# Patient Record
Sex: FEMALE | Race: ASIAN
[De-identification: names, ages, dates, MRNs, and addresses within clinical notes are randomized per-mention and may not be internally consistent; named-entity substitution may affect disease eponyms.]

---

## 2018-03-02 ENCOUNTER — APPOINTMENT (OUTPATIENT)
Dept: ORTHOPEDIC SURGERY | Facility: CLINIC | Age: 83
End: 2018-03-02
Payer: MEDICARE

## 2018-03-02 VITALS — BODY MASS INDEX: 24.19 KG/M2 | WEIGHT: 120 LBS | HEIGHT: 59 IN

## 2018-03-02 DIAGNOSIS — I10 ESSENTIAL (PRIMARY) HYPERTENSION: ICD-10-CM

## 2018-03-02 DIAGNOSIS — Z80.9 FAMILY HISTORY OF MALIGNANT NEOPLASM, UNSPECIFIED: ICD-10-CM

## 2018-03-02 DIAGNOSIS — S53.105A UNSPECIFIED DISLOCATION OF LEFT ULNOHUMERAL JOINT, INITIAL ENCOUNTER: ICD-10-CM

## 2018-03-02 PROBLEM — Z00.00 ENCOUNTER FOR PREVENTIVE HEALTH EXAMINATION: Status: ACTIVE | Noted: 2018-03-02

## 2018-03-02 PROCEDURE — 99204 OFFICE O/P NEW MOD 45 MIN: CPT

## 2018-03-02 RX ORDER — FUROSEMIDE 20 MG/1
20 TABLET ORAL
Qty: 14 | Refills: 0 | Status: ACTIVE | COMMUNITY
Start: 2017-09-03

## 2018-03-02 RX ORDER — TRAMADOL HYDROCHLORIDE 50 MG/1
50 TABLET, COATED ORAL
Qty: 30 | Refills: 0 | Status: ACTIVE | COMMUNITY
Start: 2017-12-27

## 2018-03-02 RX ORDER — CYCLOSPORINE 0.5 MG/ML
0.05 EMULSION OPHTHALMIC
Qty: 60 | Refills: 0 | Status: ACTIVE | COMMUNITY
Start: 2018-02-05

## 2018-03-02 RX ORDER — GABAPENTIN 100 MG/1
100 CAPSULE ORAL
Qty: 270 | Refills: 0 | Status: ACTIVE | COMMUNITY
Start: 2018-02-05

## 2018-03-02 RX ORDER — HYDROCHLOROTHIAZIDE 12.5 MG/1
12.5 TABLET ORAL
Qty: 90 | Refills: 0 | Status: ACTIVE | COMMUNITY
Start: 2018-02-05

## 2018-03-02 RX ORDER — OLOPATADINE HYDROCHLORIDE 7 MG/ML
0.7 SOLUTION OPHTHALMIC
Qty: 2 | Refills: 0 | Status: ACTIVE | COMMUNITY
Start: 2017-10-29

## 2018-03-02 RX ORDER — RIVAROXABAN 15 MG/1
15 TABLET, FILM COATED ORAL
Qty: 90 | Refills: 0 | Status: ACTIVE | COMMUNITY
Start: 2017-12-07

## 2018-03-02 RX ORDER — DICLOFENAC SODIUM 10 MG/G
1 GEL TOPICAL
Qty: 100 | Refills: 0 | Status: ACTIVE | COMMUNITY
Start: 2017-10-31

## 2018-03-02 RX ORDER — AMLODIPINE BESYLATE 5 MG/1
5 TABLET ORAL
Qty: 90 | Refills: 0 | Status: ACTIVE | COMMUNITY
Start: 2017-12-08

## 2018-03-21 ENCOUNTER — INPATIENT (INPATIENT)
Facility: HOSPITAL | Age: 83
LOS: 0 days | Discharge: HOME | DRG: 502 | End: 2018-03-22
Payer: COMMERCIAL

## 2018-03-21 VITALS
RESPIRATION RATE: 18 BRPM | HEIGHT: 61 IN | TEMPERATURE: 98 F | SYSTOLIC BLOOD PRESSURE: 170 MMHG | DIASTOLIC BLOOD PRESSURE: 75 MMHG | HEART RATE: 81 BPM | WEIGHT: 121.03 LBS | OXYGEN SATURATION: 98 %

## 2018-03-21 DIAGNOSIS — M24.422: ICD-10-CM

## 2018-03-21 DIAGNOSIS — M81.0 AGE-RELATED OSTEOPOROSIS WITHOUT CURRENT PATHOLOGICAL FRACTURE: ICD-10-CM

## 2018-03-21 DIAGNOSIS — S53.125A POSTERIOR DISLOCATION OF LEFT ULNOHUMERAL JOINT, INITIAL ENCOUNTER: ICD-10-CM

## 2018-03-21 DIAGNOSIS — E87.6 HYPOKALEMIA: ICD-10-CM

## 2018-03-21 DIAGNOSIS — I10 ESSENTIAL (PRIMARY) HYPERTENSION: ICD-10-CM

## 2018-03-21 DIAGNOSIS — M24.022 LOOSE BODY IN LEFT ELBOW: ICD-10-CM

## 2018-03-21 DIAGNOSIS — Z79.01 LONG TERM (CURRENT) USE OF ANTICOAGULANTS: ICD-10-CM
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
## 2018-03-21 DIAGNOSIS — I48.2 CHRONIC ATRIAL FIBRILLATION: ICD-10-CM
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
## 2018-03-21 DIAGNOSIS — E55.9 VITAMIN D DEFICIENCY, UNSPECIFIED: ICD-10-CM

## 2018-03-21 DIAGNOSIS — S42.409A UNSPECIFIED FRACTURE OF LOWER END OF UNSPECIFIED HUMERUS, INITIAL ENCOUNTER FOR CLOSED FRACTURE: ICD-10-CM

## 2018-03-21 RX ORDER — RIVAROXABAN 15 MG-20MG
15 KIT ORAL EVERY 24 HOURS
Refills: 0 | Status: DISCONTINUED | OUTPATIENT
Start: 2018-03-21 | End: 2018-03-22

## 2018-03-21 RX ORDER — MORPHINE SULFATE 50 MG/1
4 CAPSULE, EXTENDED RELEASE ORAL
Refills: 0 | Status: DISCONTINUED | OUTPATIENT
Start: 2018-03-21 | End: 2018-03-21

## 2018-03-21 RX ORDER — POLYETHYLENE GLYCOL 3350 17 G/17G
17 POWDER, FOR SOLUTION ORAL DAILY
Refills: 0 | Status: DISCONTINUED | OUTPATIENT
Start: 2018-03-21 | End: 2018-03-22

## 2018-03-21 RX ORDER — MAGNESIUM HYDROXIDE 400 MG/1
30 TABLET, CHEWABLE ORAL DAILY
Refills: 0 | Status: DISCONTINUED | OUTPATIENT
Start: 2018-03-21 | End: 2018-03-22

## 2018-03-21 RX ORDER — DOCUSATE SODIUM 100 MG
100 CAPSULE ORAL THREE TIMES A DAY
Refills: 0 | Status: DISCONTINUED | OUTPATIENT
Start: 2018-03-21 | End: 2018-03-22

## 2018-03-21 RX ORDER — SODIUM CHLORIDE 9 MG/ML
1000 INJECTION, SOLUTION INTRAVENOUS
Refills: 0 | Status: DISCONTINUED | OUTPATIENT
Start: 2018-03-21 | End: 2018-03-22

## 2018-03-21 RX ORDER — MORPHINE SULFATE 50 MG/1
2 CAPSULE, EXTENDED RELEASE ORAL EVERY 4 HOURS
Refills: 0 | Status: DISCONTINUED | OUTPATIENT
Start: 2018-03-21 | End: 2018-03-22

## 2018-03-21 RX ORDER — CEFAZOLIN SODIUM 1 G
2000 VIAL (EA) INJECTION EVERY 8 HOURS
Refills: 0 | Status: COMPLETED | OUTPATIENT
Start: 2018-03-21 | End: 2018-03-22

## 2018-03-21 RX ORDER — ACETAMINOPHEN 500 MG
650 TABLET ORAL EVERY 6 HOURS
Refills: 0 | Status: DISCONTINUED | OUTPATIENT
Start: 2018-03-21 | End: 2018-03-22

## 2018-03-21 RX ORDER — ONDANSETRON 8 MG/1
4 TABLET, FILM COATED ORAL EVERY 6 HOURS
Refills: 0 | Status: DISCONTINUED | OUTPATIENT
Start: 2018-03-21 | End: 2018-03-22

## 2018-03-21 RX ORDER — OXYCODONE HYDROCHLORIDE 5 MG/1
5 TABLET ORAL EVERY 4 HOURS
Refills: 0 | Status: DISCONTINUED | OUTPATIENT
Start: 2018-03-21 | End: 2018-03-22

## 2018-03-21 RX ORDER — OXYCODONE HYDROCHLORIDE 5 MG/1
10 TABLET ORAL EVERY 4 HOURS
Refills: 0 | Status: DISCONTINUED | OUTPATIENT
Start: 2018-03-21 | End: 2018-03-22

## 2018-03-21 RX ORDER — SENNA PLUS 8.6 MG/1
2 TABLET ORAL AT BEDTIME
Refills: 0 | Status: DISCONTINUED | OUTPATIENT
Start: 2018-03-21 | End: 2018-03-22

## 2018-03-21 RX ORDER — AMLODIPINE BESYLATE 2.5 MG/1
5 TABLET ORAL DAILY
Refills: 0 | Status: DISCONTINUED | OUTPATIENT
Start: 2018-03-21 | End: 2018-03-22

## 2018-03-21 RX ADMIN — MORPHINE SULFATE 4 MILLIGRAM(S): 50 CAPSULE, EXTENDED RELEASE ORAL at 16:23

## 2018-03-21 RX ADMIN — MORPHINE SULFATE 4 MILLIGRAM(S): 50 CAPSULE, EXTENDED RELEASE ORAL at 16:08

## 2018-03-21 RX ADMIN — OXYCODONE HYDROCHLORIDE 5 MILLIGRAM(S): 5 TABLET ORAL at 23:38

## 2018-03-21 RX ADMIN — MORPHINE SULFATE 4 MILLIGRAM(S): 50 CAPSULE, EXTENDED RELEASE ORAL at 16:40

## 2018-03-21 RX ADMIN — Medication 100 MILLIGRAM(S): at 23:38

## 2018-03-21 RX ADMIN — Medication 100 MILLIGRAM(S): at 21:00

## 2018-03-21 RX ADMIN — MORPHINE SULFATE 4 MILLIGRAM(S): 50 CAPSULE, EXTENDED RELEASE ORAL at 16:55

## 2018-03-21 NOTE — PROGRESS NOTE ADULT - SUBJECTIVE AND OBJECTIVE BOX
Orthopedics Post Op Check    Procedure: LEFT ELBOW UCL RECONSTRUCTION  Surgeon: LILY Bell. stable at bedside with family who speaks and translated in [place]. Pt. states she has a "pulsating pain in left arm."   Denies CP, SOB, N/V, numbness/tingling in left ue.     Vital Signs Last 24 Hrs  T(C): 36.7 (21 Mar 2018 12:09), Max: 36.7 (21 Mar 2018 12:09)  T(F): 98 (21 Mar 2018 12:09), Max: 98 (21 Mar 2018 12:09)  HR: 60 (21 Mar 2018 16:39) (60 - 81)  BP: 127/61 (21 Mar 2018 16:39) (110/68 - 170/75)  BP(mean): 88 (21 Mar 2018 16:39) (82 - 105)  RR: 26 (21 Mar 2018 16:39) (18 - 59)  SpO2: 100% (21 Mar 2018 16:39) (98% - 100%)      Dressing C/D/I with splint in place and ice   left ue in sling   Pulses: DP/PT  unable to palpate 2/2 to splint B/L LES  SLT:  intact  B/L LES  Motor:   3/5 2/2 to pain, ROM limited due to splint     A/P: 84yoFemale POD#0 s/p left elbow UCL reconstruction  - Stable  - Pain Control  - DVT ppx: scds/ xeralto (hx of afib)   - Post op abx: ancef  - PT, WBS: nwb left ue   - F/U AM Labs

## 2018-03-21 NOTE — H&P ADULT - NSHPPHYSICALEXAM_GEN_ALL_CORE
Head normal, atraumatic. Skin warm, dry and intact  MSK: +Decreased ROM secondary to pain, left elbow  Delt/bicep/tricep/WF/WE//intrinsics 5/5 strength b/l  Sensation intact and equal to bilateral UE distally.  Radial pulses palpable, fingers warm and well perfused, cap refill brisk.    Remainder of physical exam per medical clearance note. Head normal, atraumatic. Skin warm, dry and intact  MSK: +Decreased ROM secondary to pain, left elbow  WF/WE//intrinsics 5/5 strength b/l  Sensation intact and equal to bilateral UE distally.  Radial pulses palpable, fingers warm and well perfused, cap refill brisk.    Remainder of physical exam per medical clearance note.

## 2018-03-21 NOTE — BRIEF OPERATIVE NOTE - PROCEDURE
<<-----Click on this checkbox to enter Procedure Repair of UCL of left upper extremity  03/21/2018    Active  CHANO

## 2018-03-21 NOTE — H&P ADULT - HISTORY OF PRESENT ILLNESS
84F c/o left elbow pain x    Presents today for elective left total elbow replacement 84F c/o left elbow pain x 2 months s/p mechanical fall on 12/18/17. Pt went to her PMD following the injury who sent her to the ED for imaging of the elbow, revealing a dislocation. Pt's granddaughter states that the joint was reduced after the injury. Pt's granddaughter also states that there was a lot of back and forth between various doctors prior to the pt seeing Dr. Ulrich for definitive treatment. Pt is right hand dominant. Pt reports that pain in left elbow is intermittent and positional. Denies numbness, tingling or weakness of bilateral upper extremities. Denies h/o DVT. Ambulates without an assistive device at baseline. Denies fever, chills, CP, SOB, N/V, other systemic symptoms today.     Of note, pt pmh significant for chronic Afib on Xarelto 15mg daily - held x 4 days pre-operatively (last dose 3/17) per cardiologist recommendation (Dr Sahil Henderson).    Presents today for elective left total elbow replacement

## 2018-03-21 NOTE — H&P ADULT - NSHPLABSRESULTS_GEN_ALL_CORE
Preop CBC, CMP, PT/PTT/INR, UA/UCX - WNL per medical clearance   Preop EKG - atrial fibrillation  Preop CXR - no acute disease

## 2018-03-21 NOTE — H&P ADULT - PROBLEM SELECTOR PLAN 2
Continue hydrochlorothiazide and norvasc Xarelto 15mg daily, held pre-operatively x 4 days (last dose 3/17)

## 2018-03-22 ENCOUNTER — TRANSCRIPTION ENCOUNTER (OUTPATIENT)
Age: 83
End: 2018-03-22

## 2018-03-22 VITALS
OXYGEN SATURATION: 98 % | TEMPERATURE: 98 F | HEART RATE: 75 BPM | DIASTOLIC BLOOD PRESSURE: 79 MMHG | RESPIRATION RATE: 15 BRPM | SYSTOLIC BLOOD PRESSURE: 126 MMHG

## 2018-03-22 LAB
ANION GAP SERPL CALC-SCNC: 12 MMOL/L — SIGNIFICANT CHANGE UP (ref 5–17)
BUN SERPL-MCNC: 19 MG/DL — SIGNIFICANT CHANGE UP (ref 7–23)
CALCIUM SERPL-MCNC: 8.9 MG/DL — SIGNIFICANT CHANGE UP (ref 8.4–10.5)
CHLORIDE SERPL-SCNC: 96 MMOL/L — SIGNIFICANT CHANGE UP (ref 96–108)
CO2 SERPL-SCNC: 29 MMOL/L — SIGNIFICANT CHANGE UP (ref 22–31)
CREAT SERPL-MCNC: 0.89 MG/DL — SIGNIFICANT CHANGE UP (ref 0.5–1.3)
GLUCOSE SERPL-MCNC: 151 MG/DL — HIGH (ref 70–99)
HCT VFR BLD CALC: 35.6 % — SIGNIFICANT CHANGE UP (ref 34.5–45)
HGB BLD-MCNC: 11.7 G/DL — SIGNIFICANT CHANGE UP (ref 11.5–15.5)
MCHC RBC-ENTMCNC: 30.4 PG — SIGNIFICANT CHANGE UP (ref 27–34)
MCHC RBC-ENTMCNC: 32.9 G/DL — SIGNIFICANT CHANGE UP (ref 32–36)
MCV RBC AUTO: 92.5 FL — SIGNIFICANT CHANGE UP (ref 80–100)
PLATELET # BLD AUTO: 219 K/UL — SIGNIFICANT CHANGE UP (ref 150–400)
POTASSIUM SERPL-MCNC: 3.4 MMOL/L — LOW (ref 3.5–5.3)
POTASSIUM SERPL-SCNC: 3.4 MMOL/L — LOW (ref 3.5–5.3)
RBC # BLD: 3.85 M/UL — SIGNIFICANT CHANGE UP (ref 3.8–5.2)
RBC # FLD: 14.6 % — SIGNIFICANT CHANGE UP (ref 10.3–16.9)
SODIUM SERPL-SCNC: 137 MMOL/L — SIGNIFICANT CHANGE UP (ref 135–145)
WBC # BLD: 8 K/UL — SIGNIFICANT CHANGE UP (ref 3.8–10.5)
WBC # FLD AUTO: 8 K/UL — SIGNIFICANT CHANGE UP (ref 3.8–10.5)

## 2018-03-22 RX ORDER — OXYCODONE AND ACETAMINOPHEN 5; 325 MG/1; MG/1
1 TABLET ORAL
Qty: 35 | Refills: 0
Start: 2018-03-22 | End: 2018-03-28

## 2018-03-22 RX ORDER — POTASSIUM CHLORIDE 20 MEQ
40 PACKET (EA) ORAL ONCE
Refills: 0 | Status: COMPLETED | OUTPATIENT
Start: 2018-03-22 | End: 2018-03-22

## 2018-03-22 RX ADMIN — MORPHINE SULFATE 2 MILLIGRAM(S): 50 CAPSULE, EXTENDED RELEASE ORAL at 03:15

## 2018-03-22 RX ADMIN — Medication 100 MILLIGRAM(S): at 05:42

## 2018-03-22 RX ADMIN — Medication 100 MILLIGRAM(S): at 06:12

## 2018-03-22 RX ADMIN — OXYCODONE HYDROCHLORIDE 5 MILLIGRAM(S): 5 TABLET ORAL at 06:00

## 2018-03-22 RX ADMIN — OXYCODONE HYDROCHLORIDE 5 MILLIGRAM(S): 5 TABLET ORAL at 05:20

## 2018-03-22 RX ADMIN — MORPHINE SULFATE 2 MILLIGRAM(S): 50 CAPSULE, EXTENDED RELEASE ORAL at 03:00

## 2018-03-22 RX ADMIN — OXYCODONE HYDROCHLORIDE 5 MILLIGRAM(S): 5 TABLET ORAL at 10:45

## 2018-03-22 RX ADMIN — AMLODIPINE BESYLATE 5 MILLIGRAM(S): 2.5 TABLET ORAL at 06:13

## 2018-03-22 RX ADMIN — OXYCODONE HYDROCHLORIDE 5 MILLIGRAM(S): 5 TABLET ORAL at 00:20

## 2018-03-22 RX ADMIN — OXYCODONE HYDROCHLORIDE 5 MILLIGRAM(S): 5 TABLET ORAL at 10:24

## 2018-03-22 RX ADMIN — SODIUM CHLORIDE 80 MILLILITER(S): 9 INJECTION, SOLUTION INTRAVENOUS at 03:30

## 2018-03-22 RX ADMIN — POLYETHYLENE GLYCOL 3350 17 GRAM(S): 17 POWDER, FOR SOLUTION ORAL at 10:16

## 2018-03-22 RX ADMIN — Medication 40 MILLIEQUIVALENT(S): at 10:15

## 2018-03-22 RX ADMIN — Medication 650 MILLIGRAM(S): at 10:25

## 2018-03-22 NOTE — PROGRESS NOTE ADULT - SUBJECTIVE AND OBJECTIVE BOX
S: Patient seen and examined. Pt. doing well, Pain endorsed but controlled. No acute events overnight.    Vital Signs Last 24 Hrs  T(C): 37 (22 Mar 2018 06:10), Max: 37.4 (22 Mar 2018 05:18)  T(F): 98.6 (22 Mar 2018 06:10), Max: 99.4 (22 Mar 2018 05:18)  HR: 77 (22 Mar 2018 06:10) (60 - 89)  BP: 122/81 (22 Mar 2018 06:10) (101/68 - 170/75)  BP(mean): 96 (21 Mar 2018 17:39) (82 - 105)  RR: 16 (22 Mar 2018 06:10) (10 - 59)  SpO2: 97% (22 Mar 2018 06:10) (96% - 100%)    NAD, AOx3, comfortable  Motor: 5/5 GS/TA/EHL/FHL    Sensation: SILT   Pulses: WWP, DP/PT 2+    Dressing: C/D/I                          11.7   8.0   )-----------( 219      ( 22 Mar 2018 06:23 )             35.6         A/P:  84y Female s/p L elbow reduction, LUCL repair, UCL repair on 3/21         -Stable  -Pain Control  -PT/NWB  -DVT ppx: Xarelto  -Advance diet as tolerated  -f/u AM labs  -Dispo: Home

## 2018-03-22 NOTE — DISCHARGE NOTE ADULT - CARE PLAN
Principal Discharge DX:	Closed fracture dislocation of elbow  Goal:	Improvement after surgery  Assessment and plan of treatment:	Left upper extremity is to remain completely non-weight bearing in sling  No strenuous activity, lifting, driving or returning to work until cleared by MD.  Try to have regular bowel movements, take stool softener or laxative if necessary.  May take Pepcid or Zantac for upset stomach.  Swelling may travel all the way down arm to hand, this is normal and will subside in a few weeks.  Call to schedule an appt with Dr. Ulrich for follow up, if you have staples or sutures they will be removed in office.  Contact your doctor if you experience: fever greater than 101.5, chills, chest pain, difficulty breathing, redness or excessive drainage around the incision, other concerns.   Follow up with your primary care provider. Principal Discharge DX:	Closed fracture dislocation of elbow  Goal:	Improvement after surgery  Assessment and plan of treatment:	Left upper extremity is to remain completely non-weight bearing in sling  No strenuous activity, lifting, driving or returning to work until cleared by MD.  Keep dressing clean and dry, cover to shower.  Try to have regular bowel movements, take stool softener or laxative if necessary.  May take Pepcid or Zantac for upset stomach.  Swelling may travel all the way down arm to hand, this is normal and will subside in a few weeks.  Call to schedule an appt with Dr. Ulrich for follow up, if you have staples or sutures they will be removed in office.  Contact your doctor if you experience: fever greater than 101.5, chills, chest pain, difficulty breathing, redness or excessive drainage around the incision, other concerns.   Follow up with your primary care provider.

## 2018-03-22 NOTE — DISCHARGE NOTE ADULT - MEDICATION SUMMARY - MEDICATIONS TO TAKE
I will START or STAY ON the medications listed below when I get home from the hospital:    Xarelto 15 mg oral tablet  -- 1 tab(s) by mouth once a day (in the evening)  -- Indication: For Atrial Fibrillation    Norvasc 5 mg oral tablet  -- 1 tab(s) by mouth once a day  -- Indication: For HTN (hypertension)    hydroCHLOROthiazide 12.5 mg oral capsule  -- 1 cap(s) by mouth once a day  -- Indication: For HTN (hypertension)    Multiple Vitamins oral tablet, dispersible  -- orally once a day  -- Indication: For Osteoporosis    Vitamin D3 50,000 intl units oral capsule  -- 1 cap(s) by mouth once a week  -- Indication: For Vitamin D deficiency I will START or STAY ON the medications listed below when I get home from the hospital:    oxyCODONE-acetaminophen 5 mg-325 mg oral tablet  -- 1 tab(s) by mouth every 6 hours x 7 days MDD:8  -- Caution federal law prohibits the transfer of this drug to any person other  than the person for whom it was prescribed.  May cause drowsiness.  Alcohol may intensify this effect.  Use care when operating dangerous machinery.  This prescription cannot be refilled.  This product contains acetaminophen.  Do not use  with any other product containing acetaminophen to prevent possible liver damage.  Using more of this medication than prescribed may cause serious breathing problems.    -- Indication: For Severe pain    Xarelto 15 mg oral tablet  -- 1 tab(s) by mouth once a day (in the evening)  -- Indication: For Atrial Fibrillation    Norvasc 5 mg oral tablet  -- 1 tab(s) by mouth once a day  -- Indication: For HTN (hypertension)    hydroCHLOROthiazide 12.5 mg oral capsule  -- 1 cap(s) by mouth once a day  -- Indication: For HTN (hypertension)    Multiple Vitamins oral tablet, dispersible  -- orally once a day  -- Indication: For Osteoporosis    Vitamin D3 50,000 intl units oral capsule  -- 1 cap(s) by mouth once a week  -- Indication: For Vitamin D deficiency

## 2018-03-22 NOTE — DISCHARGE NOTE ADULT - CARE PROVIDER_API CALL
Evan Ulrich), Orthopaedic Surgery  [address]  [address] [place] [address]  Phone: [phone]  Fax: [phone]

## 2018-03-22 NOTE — DISCHARGE NOTE ADULT - HOSPITAL COURSE
Admitted  Surgery - Left elbow reduction, UCL repair with internal brace, triceps repair  Evelyn-op Antibiotics - Ancef, completed  Pain control  DVT prophylaxis - Xarelto 15mg (home medication chronic AFib)  OOB/Physical Therapy - NWB left upper extr Admitted  Surgery - Left elbow reduction, UCL repair with internal brace, triceps repair  Evelyn-op Antibiotics - Ancef, completed  Pain control  DVT prophylaxis - Xarelto 15mg (home medication chronic AFib)  Hypokalemia - treated with PO potassium  OOB/Physical Therapy - NWB left upper extr

## 2018-03-22 NOTE — DISCHARGE NOTE ADULT - PLAN OF CARE
Left upper extremity is to remain completely non-weight bearing in sling  No strenuous activity, lifting, driving or returning to work until cleared by MD.  Try to have regular bowel movements, take stool softener or laxative if necessary.  May take Pepcid or Zantac for upset stomach.  Swelling may travel all the way down arm to hand, this is normal and will subside in a few weeks.  Call to schedule an appt with Dr. Ulrich for follow up, if you have staples or sutures they will be removed in office.  Contact your doctor if you experience: fever greater than 101.5, chills, chest pain, difficulty breathing, redness or excessive drainage around the incision, other concerns.   Follow up with your primary care provider. Improvement after surgery Left upper extremity is to remain completely non-weight bearing in sling  No strenuous activity, lifting, driving or returning to work until cleared by MD.  Keep dressing clean and dry, cover to shower.  Try to have regular bowel movements, take stool softener or laxative if necessary.  May take Pepcid or Zantac for upset stomach.  Swelling may travel all the way down arm to hand, this is normal and will subside in a few weeks.  Call to schedule an appt with Dr. Ulrich for follow up, if you have staples or sutures they will be removed in office.  Contact your doctor if you experience: fever greater than 101.5, chills, chest pain, difficulty breathing, redness or excessive drainage around the incision, other concerns.   Follow up with your primary care provider.

## 2018-03-22 NOTE — DISCHARGE NOTE ADULT - PATIENT PORTAL LINK FT
You can access the [facility] Patient Portal, offered by [facility], by registering with the following website: [email or web address]

## 2018-03-29 PROCEDURE — 24345 REPR ELBW MED LIGMNT W/TISSU: CPT

## 2018-03-29 PROCEDURE — 86901 BLOOD TYPING SEROLOGIC RH(D): CPT

## 2018-03-29 PROCEDURE — 86850 RBC ANTIBODY SCREEN: CPT

## 2018-03-29 PROCEDURE — 86900 BLOOD TYPING SEROLOGIC ABO: CPT

## 2018-03-29 PROCEDURE — C1713: CPT

## 2018-03-29 PROCEDURE — 85027 COMPLETE CBC AUTOMATED: CPT

## 2018-03-29 PROCEDURE — 76000 FLUOROSCOPY <1 HR PHYS/QHP: CPT

## 2018-03-29 PROCEDURE — 80048 BASIC METABOLIC PNL TOTAL CA: CPT

## 2018-03-29 PROCEDURE — 24343 REPR ELBOW LAT LIGMNT W/TISS: CPT

## 2018-04-13 ENCOUNTER — OUTPATIENT (OUTPATIENT)
Dept: OUTPATIENT SERVICES | Facility: HOSPITAL | Age: 83
LOS: 1 days | Discharge: HOME | End: 2018-04-13

## 2020-03-01 NOTE — H&P ADULT - PROBLEM SELECTOR PLAN 1
Xarelto 15mg daily, held pre-operatively x. Resume post-operatively? No Admit to Orthopaedic Service.  Presents today for elective left total elbow replacement  Pt medically stable and cleared for procedure today by Dr Alexys Valle and Dr Sahil Henderson (cardio)

## 2023-10-24 RX ORDER — CHOLECALCIFEROL (VITAMIN D3) 125 MCG
1 CAPSULE ORAL
Qty: 0 | Refills: 0 | DISCHARGE

## 2023-10-24 RX ORDER — HYDROCHLOROTHIAZIDE 25 MG
1 TABLET ORAL
Qty: 0 | Refills: 0 | DISCHARGE

## 2023-10-24 RX ORDER — RIVAROXABAN 15 MG-20MG
1 KIT ORAL
Qty: 0 | Refills: 0 | DISCHARGE

## 2023-10-24 RX ORDER — AMLODIPINE BESYLATE 2.5 MG/1
1 TABLET ORAL
Qty: 0 | Refills: 0 | DISCHARGE